# Patient Record
Sex: MALE | Race: WHITE | Employment: UNEMPLOYED | URBAN - METROPOLITAN AREA
[De-identification: names, ages, dates, MRNs, and addresses within clinical notes are randomized per-mention and may not be internally consistent; named-entity substitution may affect disease eponyms.]

---

## 2021-02-26 ENCOUNTER — HOSPITAL ENCOUNTER (OUTPATIENT)
Dept: GENERAL RADIOLOGY | Age: 46
Discharge: HOME | End: 2021-02-26
Attending: FAMILY MEDICINE
Payer: COMMERCIAL

## 2021-02-26 DIAGNOSIS — M25.552 LEFT HIP PAIN: ICD-10-CM

## 2021-02-26 DIAGNOSIS — M54.42 ACUTE BACK PAIN WITH SCIATICA, LEFT: ICD-10-CM

## 2021-02-26 PROCEDURE — 72110 X-RAY EXAM L-2 SPINE 4/>VWS: CPT | Performed by: FAMILY MEDICINE

## 2021-02-26 PROCEDURE — 73502 X-RAY EXAM HIP UNI 2-3 VIEWS: CPT | Performed by: FAMILY MEDICINE

## 2021-02-26 PROCEDURE — 72100 X-RAY EXAM L-S SPINE 2/3 VWS: CPT | Performed by: FAMILY MEDICINE

## 2021-06-17 ENCOUNTER — OFFICE VISIT (OUTPATIENT)
Dept: ORTHOPEDICS CLINIC | Facility: CLINIC | Age: 46
End: 2021-06-17

## 2021-06-17 DIAGNOSIS — M54.16 LUMBAR RADICULOPATHY: Primary | ICD-10-CM

## 2021-06-17 PROCEDURE — 99244 OFF/OP CNSLTJ NEW/EST MOD 40: CPT | Performed by: ORTHOPAEDIC SURGERY

## 2021-06-17 NOTE — PROGRESS NOTES
NURSING INTAKE COMMENTS: Patient presents with:  Consult: hip pain since 11/2020. no injuries. pain started after helping a friend move. pain is everyday. rates pain today 6/10. xrays were done 2/26/21.       HPI: This 51year old male presents today with co shortness of breath, cough, asthma, wheezing  CARDIOVASCULAR: denies chest pain, leg cramps with exertion, palpitations, leg swelling  GI: denies abdominal pain, nausea, vomiting, diarrhea, constipation, hematochezia, worsening heartburn or stomach ulcers lumbar spine reviewed the x-rays show loss of the lumbar lordosis but no spondylolisthesis. No obvious fracture moderate degenerative changes are noted along the facets mid to lower lumbar levels.     Labs:  No results found for: WBC, HGB, PLT   No results

## 2021-07-12 ENCOUNTER — HOSPITAL ENCOUNTER (OUTPATIENT)
Dept: MRI IMAGING | Facility: HOSPITAL | Age: 46
Discharge: HOME | End: 2021-07-12
Attending: ORTHOPAEDIC SURGERY
Payer: COMMERCIAL

## 2021-07-12 DIAGNOSIS — M54.16 LUMBAR RADICULOPATHY: ICD-10-CM

## 2021-07-12 PROCEDURE — 72148 MRI LUMBAR SPINE W/O DYE: CPT | Performed by: ORTHOPAEDIC SURGERY

## 2021-07-22 ENCOUNTER — OFFICE VISIT (OUTPATIENT)
Dept: ORTHOPEDICS CLINIC | Facility: CLINIC | Age: 46
End: 2021-07-22

## 2021-07-22 VITALS — BODY MASS INDEX: 25.05 KG/M2 | HEIGHT: 70 IN | WEIGHT: 175 LBS

## 2021-07-22 DIAGNOSIS — M54.16 LUMBAR RADICULOPATHY: Primary | ICD-10-CM

## 2021-07-22 PROCEDURE — 3008F BODY MASS INDEX DOCD: CPT | Performed by: ORTHOPAEDIC SURGERY

## 2021-07-22 PROCEDURE — 99213 OFFICE O/P EST LOW 20 MIN: CPT | Performed by: ORTHOPAEDIC SURGERY

## 2021-07-22 NOTE — PROGRESS NOTES
NURSING INTAKE COMMENTS: Patient presents with:  Back Pain: pt in to discuss MRI of the spine, pain at a 7/10 today      HPI: This 51year old male presents today with complaints of left lower extremity and back pain follow-up.   He continues to have some m autoimmune disease, thyroid issues, or diabetes  ALLERGY: denies asthma, seasonal allergies    Physical Examination:    Ht 5' 10\" (1.778 m)   Wt 175 lb (79.4 kg)   BMI 25.11 kg/m²   Constitutional: appears well hydrated, alert and responsive, no acute dis in significant neural compromise. 2. Linear morphology edema involving the left L5 pedicle. This finding is most commonly seen in the setting of degenerative stress response or altered biomechanics. 3. Retroaortic left renal vein.  4. Lesser incidental fin

## 2021-07-26 ENCOUNTER — TELEPHONE (OUTPATIENT)
Dept: NEUROLOGY | Facility: CLINIC | Age: 46
End: 2021-07-26

## 2021-07-26 ENCOUNTER — OFFICE VISIT (OUTPATIENT)
Dept: NEUROLOGY | Facility: CLINIC | Age: 46
End: 2021-07-26

## 2021-07-26 VITALS
SYSTOLIC BLOOD PRESSURE: 120 MMHG | WEIGHT: 175 LBS | OXYGEN SATURATION: 96 % | BODY MASS INDEX: 25.05 KG/M2 | DIASTOLIC BLOOD PRESSURE: 70 MMHG | HEART RATE: 67 BPM | HEIGHT: 70 IN

## 2021-07-26 DIAGNOSIS — M54.16 CHRONIC LUMBAR RADICULOPATHY: ICD-10-CM

## 2021-07-26 DIAGNOSIS — M47.816 FACET ARTHROPATHY, LUMBAR: Primary | ICD-10-CM

## 2021-07-26 PROCEDURE — 3078F DIAST BP <80 MM HG: CPT | Performed by: PHYSICAL MEDICINE & REHABILITATION

## 2021-07-26 PROCEDURE — 3074F SYST BP LT 130 MM HG: CPT | Performed by: PHYSICAL MEDICINE & REHABILITATION

## 2021-07-26 PROCEDURE — 3008F BODY MASS INDEX DOCD: CPT | Performed by: PHYSICAL MEDICINE & REHABILITATION

## 2021-07-26 PROCEDURE — 99244 OFF/OP CNSLTJ NEW/EST MOD 40: CPT | Performed by: PHYSICAL MEDICINE & REHABILITATION

## 2021-07-26 RX ORDER — METHYLPREDNISOLONE 4 MG/1
TABLET ORAL
Qty: 1 EACH | Refills: 0 | Status: SHIPPED | OUTPATIENT
Start: 2021-07-26

## 2021-07-26 RX ORDER — FINASTERIDE 5 MG/1
TABLET, FILM COATED ORAL
COMMUNITY
Start: 2021-07-22

## 2021-07-26 NOTE — PATIENT INSTRUCTIONS
-Medrol dose pack to be started today  -Start PT and home exercises  -My office will call once injection is approved

## 2021-07-26 NOTE — PROGRESS NOTES
[address]  NEW PATIENT EVALUATION    Consultation as a request of Dr. Janie Hurley    Chief Complaint: back pain.     HISTORY OF PRESENT ILLNESS:   Patient presents with:  Low Back Pain: c/o low back pain with s each 0   • finasteride 5 MG Oral Tab            ALLERGIES:   No Known Allergies      FAMILY HISTORY:   History reviewed. No pertinent family history.        SOCIAL HISTORY:   Social History    Tobacco Use      Smoking status: Not on file    Alcohol use: Not intact  Psychiatric: Mood and affect appropriate    Gait Normal  Able to toe walk and heel walk without any difficulty  Posture: No scoliosis or kyphosis    Musculoskeletal/Neurological Exam:    LUMBAR SPINE:  Inspection: no erythema, swelling, or obvious spinal or foraminal stenosis at this level. All imaging results were reviewed and discussed with patient. ASSESSMENT:     1. Facet arthropathy, lumbar    2.  Chronic lumbar radiculopathy        Daren Gage is a pleasant 65-year-old gentleman wh

## 2021-07-26 NOTE — TELEPHONE ENCOUNTER
Submitted request to [facility] for Left L3-4, L4-5 and L5-S1 Facet joint injection under fluoroscopy guidance CPT [id number] to be done at [facility] and for Physical Therapy CPT [id number]    Status: pending [facility] clinical review

## 2021-07-30 NOTE — TELEPHONE ENCOUNTER
Left L3-4, L4-5 and L5-S1 Facet joint injection under fluoroscopy guidance-pending approval   Called [facility], spoke to Cinda Kam who states Referral # [id number] is currently pending.    Adding to priority list for review but will not be completed till around [facility]

## 2021-08-03 NOTE — TELEPHONE ENCOUNTER
Carlee Galaviz at [facility] to check status of facet injections. -still pending

## 2021-08-03 NOTE — TELEPHONE ENCOUNTER
Received notice of Approval from [facility] at [facility] for Left L3-4, L4-5 and L5-S1 Facet joint injection under fluoroscopy guidance valid until 11/3/21 to be done at [address] [facility] to clinical staff to schedule

## 2021-08-04 NOTE — TELEPHONE ENCOUNTER
Patient has been scheduled for a Left L3-4, L4-5 and L5-S1 Facet joint injection on 8/16/21 at the [facility]. Medications and allergies reviewed.  Patient informed we will need verbal or written authorization from patients Primary Care Physician/Cardiologist to

## 2021-08-16 ENCOUNTER — OFFICE VISIT (OUTPATIENT)
Dept: SURGERY | Facility: CLINIC | Age: 46
End: 2021-08-16

## 2021-08-16 DIAGNOSIS — M47.816 FACET ARTHROPATHY, LUMBAR: Primary | ICD-10-CM

## 2021-08-16 PROCEDURE — 64493 INJ PARAVERT F JNT L/S 1 LEV: CPT | Performed by: PHYSICAL MEDICINE & REHABILITATION

## 2021-08-16 PROCEDURE — 64495 INJ PARAVERT F JNT L/S 3 LEV: CPT | Performed by: PHYSICAL MEDICINE & REHABILITATION

## 2021-08-16 PROCEDURE — 64494 INJ PARAVERT F JNT L/S 2 LEV: CPT | Performed by: PHYSICAL MEDICINE & REHABILITATION

## 2021-08-16 NOTE — PROGRESS NOTES
[facility] Z-JOINT/FACET INJECTIONS  NAME:  Tyrel Segura    MR #:    [id number] :  1975     PHYSICIAN:  Moshe Valladares. Rebecca Lundborg, DO        Operative Report    DATE OF PROCEDURE: 2021   PREOPERATIVE DIAGNOSES: 1.  Fa procedure, the patient's pulse oximetry and vital signs were monitored and they remained completely stable. Also, throughout the whole procedure, prior to injection of any medication, aspiration was performed.   No blood, fluid, or air was aspirated at any

## 2021-09-02 ENCOUNTER — TELEPHONE (OUTPATIENT)
Dept: PHYSICAL MEDICINE AND REHAB | Facility: CLINIC | Age: 46
End: 2021-09-02

## 2021-09-03 ENCOUNTER — TELEMEDICINE (OUTPATIENT)
Dept: PHYSICAL MEDICINE AND REHAB | Facility: CLINIC | Age: 46
End: 2021-09-03

## 2021-09-03 DIAGNOSIS — M79.10 MYALGIA: Primary | ICD-10-CM

## 2021-09-03 PROCEDURE — 99214 OFFICE O/P EST MOD 30 MIN: CPT | Performed by: PHYSICAL MEDICINE & REHABILITATION

## 2021-09-03 RX ORDER — DULOXETIN HYDROCHLORIDE 30 MG/1
30 CAPSULE, DELAYED RELEASE ORAL DAILY
Qty: 30 CAPSULE | Refills: 0 | Status: SHIPPED | OUTPATIENT
Start: 2021-09-03

## 2021-09-03 NOTE — PROGRESS NOTES
[address]    Telemedicine Visit - Follow Up Evaluation    Telehealth Verbal Consent   I conducted a telehealth visit with Gerliliam Leader today, 09/03/21, which was completed using two-way, real-time inter He also has pain radiating in bilateral lower extremities but cannot identify in specific areas. He denies any changes in strength sensation or bowel bladder. He feels a knifelike pain in his left hip.   He feels a knot in the muscle that he associates wi AGRATIO, ALBGLOBRAT, NA, K, CL, CO2  No results found for: PTP, PT, INR  No results found for: VITD, QVITD, VITD25, [id number]    IMAGING:   MRI LUMBAR SPINE dated July 12, 2021 revealed:      I personally reviewed MRI imaging of the lumbar spine which is no risk and complications of infection. The patient was advised that given the current situation with COVID-19, it is in his/her best interest to socially distance his/herself.  Given this, we are not recommending any elective procedures or office visits at

## 2021-09-07 ENCOUNTER — TELEPHONE (OUTPATIENT)
Dept: NEUROLOGY | Facility: CLINIC | Age: 46
End: 2021-09-07

## 2021-09-16 ENCOUNTER — TELEPHONE (OUTPATIENT)
Dept: PHYSICAL THERAPY | Age: 46
End: 2021-09-16

## 2021-09-17 ENCOUNTER — TELEPHONE (OUTPATIENT)
Dept: PHYSICAL THERAPY | Facility: HOSPITAL | Age: 46
End: 2021-09-17

## 2021-09-23 ENCOUNTER — OFFICE VISIT (OUTPATIENT)
Dept: PHYSICAL THERAPY | Age: 46
End: 2021-09-23
Attending: PHYSICAL MEDICINE & REHABILITATION
Payer: COMMERCIAL

## 2021-09-23 DIAGNOSIS — M47.816 FACET ARTHROPATHY, LUMBAR: ICD-10-CM

## 2021-09-23 DIAGNOSIS — M54.16 LUMBAR RADICULOPATHY: ICD-10-CM

## 2021-09-23 PROCEDURE — 97140 MANUAL THERAPY 1/> REGIONS: CPT | Performed by: PHYSICAL THERAPIST

## 2021-09-23 PROCEDURE — 97110 THERAPEUTIC EXERCISES: CPT | Performed by: PHYSICAL THERAPIST

## 2021-09-23 PROCEDURE — 97162 PT EVAL MOD COMPLEX 30 MIN: CPT | Performed by: PHYSICAL THERAPIST

## 2021-09-23 NOTE — PROGRESS NOTES
SPINE EVALUATION:   Referring Physician: Dr. Adeline Morse  Diagnosis: Facet arthropathy, lumbar (G27.365)     Date of Service: 9/23/2021     PATIENT SUMMARY   Medhat Bowser is a 51year old male who presents to therapy today with complaints of LBP radiating inability to tolerate sustained sitting, standing and walking, inability to bend and lift. Pt and PT discussed evaluation findings, pathology, POC and HEP. Pt voiced understanding and performs HEP correctly without reported pain.  Skilled Physical Therapy issued a HEP for: Prone laying and prone on elbows in left roadkill position 3-5 mins ea every 2 hours.     Charges: PT Eval Moderate Complexity, 30 mins      Total Timed Treatment: 30 min     Total Treatment Time: 60 min     Based on clinical rationale and therapist: Leona Claude, PT    [de-identified] certification required: Yes  I certify the need for these services furnished under this plan of treatment and while under my care.     X___________________________________________________ Date____________________

## 2021-09-27 ENCOUNTER — OFFICE VISIT (OUTPATIENT)
Dept: PHYSICAL THERAPY | Age: 46
End: 2021-09-27
Attending: PHYSICAL MEDICINE & REHABILITATION
Payer: COMMERCIAL

## 2021-09-27 PROCEDURE — 97140 MANUAL THERAPY 1/> REGIONS: CPT | Performed by: PHYSICAL THERAPIST

## 2021-09-27 PROCEDURE — 97014 ELECTRIC STIMULATION THERAPY: CPT | Performed by: PHYSICAL THERAPIST

## 2021-09-27 PROCEDURE — 97110 THERAPEUTIC EXERCISES: CPT | Performed by: PHYSICAL THERAPIST

## 2021-09-27 NOTE — PROGRESS NOTES
Dx: Facet arthropathy, lumbar M47.816  Lumbar radiculopathy M54.16    Insurance (Authorized # of Visits):  2 out of 8           Authorizing Physician: Dr. Panfilo Amaya  Next MD visit: none scheduled  Fall Risk: standard         Precautions: n/a             [employer] IFC 0-150Hz with HP 15 mins       HEP: Added JOSHUA with prop-ups and eliminated roadkill position. Charges:  Thera Ex x 2; Manual Tx x 1; Unattended E-stim x 1       Total Timed Treatment: 48 min  Total Treatment Time: 63 min

## 2021-09-29 ENCOUNTER — OFFICE VISIT (OUTPATIENT)
Dept: PHYSICAL THERAPY | Age: 46
End: 2021-09-29
Attending: PHYSICAL MEDICINE & REHABILITATION
Payer: COMMERCIAL

## 2021-09-29 PROCEDURE — 97014 ELECTRIC STIMULATION THERAPY: CPT | Performed by: PHYSICAL THERAPIST

## 2021-09-29 PROCEDURE — 97110 THERAPEUTIC EXERCISES: CPT | Performed by: PHYSICAL THERAPIST

## 2021-09-29 PROCEDURE — 97140 MANUAL THERAPY 1/> REGIONS: CPT | Performed by: PHYSICAL THERAPIST

## 2021-09-29 NOTE — PROGRESS NOTES
Dx: Facet arthropathy, lumbar M47.816  Lumbar radiculopathy M54.16    Insurance (Authorized # of Visits):  3 out of 8           Authorizing Physician: Dr. Keith Forbes  Next MD visit: none scheduled  Fall Risk: standard         Precautions: n/a             [place] Paralumbars/piriformis/gluts x 15 mins Paralumbars/piriformis/gluts x 15 mins      Modalities         IFC 0-150Hz with HP 15 mins 15 mins      HEP: Added JOSHUA with alt knee flexion with ankle pumps for dural glides 2x/day    Charges:  Thera Ex x 3; Manual Tx

## 2021-10-01 RX ORDER — DULOXETIN HYDROCHLORIDE 30 MG/1
CAPSULE, DELAYED RELEASE ORAL
Qty: 30 CAPSULE | Refills: 0 | OUTPATIENT
Start: 2021-10-01

## 2021-10-04 ENCOUNTER — OFFICE VISIT (OUTPATIENT)
Dept: PHYSICAL THERAPY | Age: 46
End: 2021-10-04
Attending: PHYSICAL MEDICINE & REHABILITATION
Payer: COMMERCIAL

## 2021-10-04 PROCEDURE — 97110 THERAPEUTIC EXERCISES: CPT | Performed by: PHYSICAL THERAPIST

## 2021-10-04 PROCEDURE — 97014 ELECTRIC STIMULATION THERAPY: CPT | Performed by: PHYSICAL THERAPIST

## 2021-10-04 PROCEDURE — 97140 MANUAL THERAPY 1/> REGIONS: CPT | Performed by: PHYSICAL THERAPIST

## 2021-10-04 NOTE — PROGRESS NOTES
Dx: Facet arthropathy, lumbar M47.816  Lumbar radiculopathy M54.16    Insurance (Authorized # of Visits):  4 out of 8           Authorizing Physician: Dr. Rocco De La Torre  Next MD visit: none scheduled  Fall Risk: standard         Precautions: n/a             [place] Prone heel squeeze  1x10x5 secs      REIL   1x10 in (L) roadkill             Manual Therapy 18 mins 15 15 mins     Lumbar PA's L3-5 Gr 1-3 x 3 mins L3-4 Gr 1-3 x 3 mins L3-4 Gr 1-3 x 3 mins     STM Paralumbars/piriformis/gluts x 15 mins Paralumbars/pirif

## 2021-10-06 ENCOUNTER — OFFICE VISIT (OUTPATIENT)
Dept: PHYSICAL THERAPY | Age: 46
End: 2021-10-06
Attending: PHYSICAL MEDICINE & REHABILITATION
Payer: COMMERCIAL

## 2021-10-06 PROCEDURE — 97110 THERAPEUTIC EXERCISES: CPT | Performed by: PHYSICAL THERAPIST

## 2021-10-06 PROCEDURE — 97014 ELECTRIC STIMULATION THERAPY: CPT | Performed by: PHYSICAL THERAPIST

## 2021-10-06 PROCEDURE — 97140 MANUAL THERAPY 1/> REGIONS: CPT | Performed by: PHYSICAL THERAPIST

## 2021-10-06 NOTE — PROGRESS NOTES
Dx: Facet arthropathy, lumbar M47.816  Lumbar radiculopathy M54.16    Insurance (Authorized # of Visits):  5 out of 8           Authorizing Physician: Dr. Mike Iraheta  Next MD visit: none scheduled  Fall Risk: standard         Precautions: n/a             [place] squeeze  1x10x5 secs  4x10; last set with knee flexion    REIL   1x10 in (L) roadkill 2x10 in roadkill            Manual Therapy 18 mins 15 15 mins 15 mins    Lumbar PA's L3-5 Gr 1-3 x 3 mins L3-4 Gr 1-3 x 3 mins L3-4 Gr 1-3 x 3 mins L3-4 Gr 3-4 x 5 mins

## 2021-10-11 ENCOUNTER — APPOINTMENT (OUTPATIENT)
Dept: PHYSICAL THERAPY | Age: 46
End: 2021-10-11
Attending: PHYSICAL MEDICINE & REHABILITATION
Payer: COMMERCIAL

## 2021-10-13 ENCOUNTER — TELEPHONE (OUTPATIENT)
Dept: NEUROLOGY | Facility: CLINIC | Age: 46
End: 2021-10-13

## 2021-10-13 ENCOUNTER — OFFICE VISIT (OUTPATIENT)
Dept: PHYSICAL THERAPY | Age: 46
End: 2021-10-13
Attending: PHYSICAL MEDICINE & REHABILITATION
Payer: COMMERCIAL

## 2021-10-13 DIAGNOSIS — M47.816 FACET ARTHROPATHY, LUMBAR: Primary | ICD-10-CM

## 2021-10-13 DIAGNOSIS — M54.16 CHRONIC LUMBAR RADICULOPATHY: ICD-10-CM

## 2021-10-13 PROCEDURE — 97110 THERAPEUTIC EXERCISES: CPT | Performed by: PHYSICAL THERAPIST

## 2021-10-13 PROCEDURE — 97140 MANUAL THERAPY 1/> REGIONS: CPT | Performed by: PHYSICAL THERAPIST

## 2021-10-13 PROCEDURE — 97014 ELECTRIC STIMULATION THERAPY: CPT | Performed by: PHYSICAL THERAPIST

## 2021-10-13 NOTE — PROGRESS NOTES
Dx: Facet arthropathy, lumbar M47.816  Lumbar radiculopathy M54.16    Insurance (Authorized # of Visits):  6 out of 8           Authorizing Physician: Dr. Harika Martin  Next MD visit: none scheduled  Fall Risk: standard         Precautions: n/a             [place] mins 3 mins 3 mins 3 mins 3 mins   JOSHUA with prop ups 3x10 2x10 3x10     Piriformins stretch Prone 5 x 30secs Prone 5 x 30secs Prone 3 x 30 secs Prone 3x30 secs Prone 3x30 secs   JOSHUA with knee flexion/ankle pumps  2x10 2x10 3x10 3x10   Prone heel squeeze  1

## 2021-10-13 NOTE — TELEPHONE ENCOUNTER
Submitted request to [facility] MA[facility]SA for Additional Physical Therapy CPT 30051     Status: pending [facility] clinical review

## 2021-10-13 NOTE — TELEPHONE ENCOUNTER
PT ordered. Thank you.     Luciana Humphrey DO, FAAPMR & CAQSM  Physical Medicine and Rehabilitation/Sports Medicine  MEDICAL CENTER [facility]

## 2021-10-13 NOTE — TELEPHONE ENCOUNTER
Requesting more PT, patient has been scheduled already. PT order    S/w patient is okay with his virtual appointment.

## 2021-10-15 ENCOUNTER — OFFICE VISIT (OUTPATIENT)
Dept: PHYSICAL THERAPY | Age: 46
End: 2021-10-15
Attending: PHYSICAL THERAPIST
Payer: COMMERCIAL

## 2021-10-15 PROCEDURE — 97014 ELECTRIC STIMULATION THERAPY: CPT | Performed by: PHYSICAL THERAPIST

## 2021-10-15 PROCEDURE — 97140 MANUAL THERAPY 1/> REGIONS: CPT | Performed by: PHYSICAL THERAPIST

## 2021-10-15 PROCEDURE — 97110 THERAPEUTIC EXERCISES: CPT | Performed by: PHYSICAL THERAPIST

## 2021-10-15 NOTE — PROGRESS NOTES
ProgressSummary  Pt has attended 7 out of 8 authorized visits in Physical Therapy.    Dx: Facet arthropathy, lumbar M47.816  Lumbar radiculopathy M54.16    Insurance (Authorized # of Visits):  7 out of 8           Authorizing Physician: Dr. Edie Groves MD sleep.  [place] will be able to achieve WNL lumbar AROM to allow tolerance to sustained standing and walking without symptom aggravation  [place] will be able to return to painfree lumbar flexion to allow bending and performance of work and ADL's below waist l 15 mins 15 mins   HEP: Continue REIL every 2 hours. Charges:  Thera Ex x 2; Manual Tx x 1; Unattended E-stim x 1       Total Timed Treatment: 38 min  Total Treatment Time: 53 min    Plan: Continue skilled Physical Therapy 2 x/week or a total of 16 visits

## 2021-10-18 ENCOUNTER — OFFICE VISIT (OUTPATIENT)
Dept: PHYSICAL THERAPY | Age: 46
End: 2021-10-18
Attending: PHYSICAL MEDICINE & REHABILITATION
Payer: COMMERCIAL

## 2021-10-18 PROCEDURE — 97014 ELECTRIC STIMULATION THERAPY: CPT | Performed by: PHYSICAL THERAPIST

## 2021-10-18 PROCEDURE — 97140 MANUAL THERAPY 1/> REGIONS: CPT | Performed by: PHYSICAL THERAPIST

## 2021-10-18 PROCEDURE — 97110 THERAPEUTIC EXERCISES: CPT | Performed by: PHYSICAL THERAPIST

## 2021-10-18 NOTE — PROGRESS NOTES
Dx: Facet arthropathy, lumbar M47.816  Lumbar radiculopathy M54.16    Insurance (Authorized # of Visits):  8 out of 8           Authorizing Physician: Dr. Jose Payne  Next MD visit: none scheduled  Fall Risk: standard         Precautions: n/a             [place] 12 mins 12% grade 1. 2mph x 15 mins   Prone laying 5 mins 5 mins 3 mins 3 mins 3 mins 3 mins    Prone on elbows 3 mins 3 mins 3 mins 3 mins 3 mins 3 mins    JOSHUA with prop ups 3x10 2x10 3x10       Piriformins stretch Prone 5 x 30secs Prone 5 x 30secs Prone 3

## 2021-10-25 ENCOUNTER — APPOINTMENT (OUTPATIENT)
Dept: PHYSICAL THERAPY | Age: 46
End: 2021-10-25
Attending: PHYSICAL MEDICINE & REHABILITATION
Payer: COMMERCIAL

## 2021-10-26 ENCOUNTER — TELEPHONE (OUTPATIENT)
Dept: PHYSICAL THERAPY | Facility: HOSPITAL | Age: 46
End: 2021-10-26

## 2021-10-27 ENCOUNTER — APPOINTMENT (OUTPATIENT)
Dept: PHYSICAL THERAPY | Age: 46
End: 2021-10-27
Attending: PHYSICAL MEDICINE & REHABILITATION
Payer: COMMERCIAL

## 2021-10-28 ENCOUNTER — APPOINTMENT (OUTPATIENT)
Dept: PHYSICAL THERAPY | Age: 46
End: 2021-10-28
Attending: PHYSICAL MEDICINE & REHABILITATION
Payer: COMMERCIAL

## 2021-10-29 ENCOUNTER — TELEPHONE (OUTPATIENT)
Dept: PHYSICAL THERAPY | Facility: HOSPITAL | Age: 46
End: 2021-10-29

## 2021-10-29 ENCOUNTER — TELEMEDICINE (OUTPATIENT)
Dept: PHYSICAL MEDICINE AND REHAB | Facility: CLINIC | Age: 46
End: 2021-10-29

## 2021-10-29 DIAGNOSIS — M79.10 MYALGIA: Primary | ICD-10-CM

## 2021-10-29 PROCEDURE — 99214 OFFICE O/P EST MOD 30 MIN: CPT | Performed by: PHYSICAL MEDICINE & REHABILITATION

## 2021-10-29 RX ORDER — MELOXICAM 15 MG/1
15 TABLET ORAL DAILY
Qty: 14 TABLET | Refills: 0 | Status: SHIPPED | OUTPATIENT
Start: 2021-10-29 | End: 2021-11-12

## 2021-11-01 PROBLEM — M79.10 MYALGIA: Status: ACTIVE | Noted: 2021-11-01

## 2021-11-01 NOTE — PROGRESS NOTES
[address]    Telemedicine Visit - Follow Up Evaluation    Telehealth Verbal Consent   I conducted a telehealth visit with Tayla Irizarry today, 10/29/21, which was completed using two-way, real-time inter not tolerate it due to side effects. He does feel improvement with NSAID medication. He denies any change in strength sensation or bowel bladder.   Overall, his pain is better however he still has some discomfort that affects his function and ability to s GLOBULIN, AGRATIO, ALBGLOBRAT, NA, K, CL, CO2  No results found for: PTP, PT, INR  No results found for: VITD, QVITD, VITD25, [id number]    IMAGING:   MRI LUMBAR SPINE dated July 12, 2021 revealed:      I personally reviewed MRI imaging of the lumbar spine w his/herself. Given this, we are not recommending any elective procedures or office visits at the outpatient surgery center or in the office respectively unless deemed necessary.   My staff will be reaching out to the patient for the elective procedure when

## 2021-11-02 ENCOUNTER — APPOINTMENT (OUTPATIENT)
Dept: PHYSICAL THERAPY | Age: 46
End: 2021-11-02
Attending: PHYSICAL MEDICINE & REHABILITATION
Payer: COMMERCIAL

## 2021-11-02 ENCOUNTER — TELEPHONE (OUTPATIENT)
Dept: PHYSICAL MEDICINE AND REHAB | Facility: CLINIC | Age: 46
End: 2021-11-02

## 2021-11-04 ENCOUNTER — APPOINTMENT (OUTPATIENT)
Dept: PHYSICAL THERAPY | Age: 46
End: 2021-11-04
Attending: PHYSICAL MEDICINE & REHABILITATION
Payer: COMMERCIAL

## 2021-11-08 ENCOUNTER — OFFICE VISIT (OUTPATIENT)
Dept: PHYSICAL THERAPY | Age: 46
End: 2021-11-08
Attending: PHYSICAL MEDICINE & REHABILITATION
Payer: COMMERCIAL

## 2021-11-08 PROCEDURE — 97014 ELECTRIC STIMULATION THERAPY: CPT | Performed by: PHYSICAL THERAPIST

## 2021-11-08 PROCEDURE — 97140 MANUAL THERAPY 1/> REGIONS: CPT | Performed by: PHYSICAL THERAPIST

## 2021-11-08 PROCEDURE — 97110 THERAPEUTIC EXERCISES: CPT | Performed by: PHYSICAL THERAPIST

## 2021-11-08 NOTE — PROGRESS NOTES
Dx: Facet arthropathy, lumbar M47.816  Lumbar radiculopathy M54.16    Insurance (Authorized # of Visits):  12           Authorizing Physician: Dr. Edie Jasso  Next MD visit: none scheduled  Fall Risk: standard         Precautions: n/a           Pain ratin/1 Piriformins stretch Prone 3x30 secs Prone 3x30 secs      JOSHUA with knee flexion/ankle pumps 3x10  3x10     Prone heel squeeze 3x10; last 2 sets with knee flexion  3x10 with knee flexion 3x10 with knee flexion    REIL 2x10 with therapist overpressure  3x10

## 2021-11-09 ENCOUNTER — APPOINTMENT (OUTPATIENT)
Dept: PHYSICAL THERAPY | Age: 46
End: 2021-11-09
Attending: PHYSICAL MEDICINE & REHABILITATION
Payer: COMMERCIAL

## 2021-11-10 ENCOUNTER — OFFICE VISIT (OUTPATIENT)
Dept: PHYSICAL THERAPY | Age: 46
End: 2021-11-10
Attending: PHYSICAL MEDICINE & REHABILITATION
Payer: COMMERCIAL

## 2021-11-10 PROCEDURE — 97110 THERAPEUTIC EXERCISES: CPT | Performed by: PHYSICAL THERAPIST

## 2021-11-10 PROCEDURE — 97014 ELECTRIC STIMULATION THERAPY: CPT | Performed by: PHYSICAL THERAPIST

## 2021-11-10 PROCEDURE — 97140 MANUAL THERAPY 1/> REGIONS: CPT | Performed by: PHYSICAL THERAPIST

## 2021-11-10 NOTE — PROGRESS NOTES
Dx: Facet arthropathy, lumbar M47.816  Lumbar radiculopathy M54.16    Insurance (Authorized # of Visits):  12           Authorizing Physician: Dr. Mike Iraheta  Next MD visit: none scheduled  Fall Risk: standard         Precautions: n/a           Pain rating: 3/1 JOSHUA with prop ups         Piriformins stretch Prone 3x30 secs Prone 3x30 secs       JOSHUA with knee flexion/ankle pumps 3x10  3x10      Prone heel squeeze 3x10; last 2 sets with knee flexion  3x10 with knee flexion 3x10 with knee flexion     REIL 2x10 w

## 2021-11-11 ENCOUNTER — APPOINTMENT (OUTPATIENT)
Dept: PHYSICAL THERAPY | Age: 46
End: 2021-11-11
Attending: PHYSICAL MEDICINE & REHABILITATION
Payer: COMMERCIAL

## 2021-11-15 ENCOUNTER — TELEPHONE (OUTPATIENT)
Dept: PHYSICAL THERAPY | Facility: HOSPITAL | Age: 46
End: 2021-11-15

## 2021-11-15 ENCOUNTER — APPOINTMENT (OUTPATIENT)
Dept: PHYSICAL THERAPY | Age: 46
End: 2021-11-15
Attending: PHYSICAL MEDICINE & REHABILITATION
Payer: COMMERCIAL

## 2021-11-16 ENCOUNTER — APPOINTMENT (OUTPATIENT)
Dept: PHYSICAL THERAPY | Age: 46
End: 2021-11-16
Attending: PHYSICAL MEDICINE & REHABILITATION
Payer: COMMERCIAL

## 2021-11-17 ENCOUNTER — OFFICE VISIT (OUTPATIENT)
Dept: PHYSICAL THERAPY | Age: 46
End: 2021-11-17
Attending: PHYSICAL MEDICINE & REHABILITATION
Payer: COMMERCIAL

## 2021-11-17 PROCEDURE — 97140 MANUAL THERAPY 1/> REGIONS: CPT | Performed by: PHYSICAL THERAPIST

## 2021-11-17 PROCEDURE — 97014 ELECTRIC STIMULATION THERAPY: CPT | Performed by: PHYSICAL THERAPIST

## 2021-11-17 PROCEDURE — 97110 THERAPEUTIC EXERCISES: CPT | Performed by: PHYSICAL THERAPIST

## 2021-11-17 NOTE — PROGRESS NOTES
Dx: Facet arthropathy, lumbar M47.816  Lumbar radiculopathy M54.16    Insurance (Authorized # of Visits):  12           Authorizing Physician: Dr. Panfilo Amaya  Next MD visit: none scheduled  Fall Risk: standard         Precautions: n/a           Pain ratin/1 [place] TA isometrics 3x10       Prone multifidus isometrics 1x10 2x10      Prone hip ext 2x10 3x10      Standing hip 3-way 2x10 airex 3x10 BOSU 3x10 BOSU     Lateral step ups with dips 2x10 airex 2x10 BOSU      Pallof press   3x10 green     [place]

## 2021-11-18 ENCOUNTER — APPOINTMENT (OUTPATIENT)
Dept: PHYSICAL THERAPY | Age: 46
End: 2021-11-18
Attending: PHYSICAL MEDICINE & REHABILITATION
Payer: COMMERCIAL

## 2021-11-22 ENCOUNTER — APPOINTMENT (OUTPATIENT)
Dept: PHYSICAL THERAPY | Age: 46
End: 2021-11-22
Attending: PHYSICAL MEDICINE & REHABILITATION
Payer: COMMERCIAL

## 2021-11-24 ENCOUNTER — APPOINTMENT (OUTPATIENT)
Dept: PHYSICAL THERAPY | Age: 46
End: 2021-11-24
Attending: PHYSICAL MEDICINE & REHABILITATION
Payer: COMMERCIAL

## 2021-11-29 ENCOUNTER — APPOINTMENT (OUTPATIENT)
Dept: PHYSICAL THERAPY | Age: 46
End: 2021-11-29
Attending: PHYSICAL MEDICINE & REHABILITATION
Payer: COMMERCIAL

## 2021-12-01 ENCOUNTER — OFFICE VISIT (OUTPATIENT)
Dept: PHYSICAL THERAPY | Age: 46
End: 2021-12-01
Attending: PHYSICAL MEDICINE & REHABILITATION
Payer: COMMERCIAL

## 2021-12-01 PROCEDURE — 97035 APP MDLTY 1+ULTRASOUND EA 15: CPT | Performed by: PHYSICAL THERAPIST

## 2021-12-01 PROCEDURE — 97140 MANUAL THERAPY 1/> REGIONS: CPT | Performed by: PHYSICAL THERAPIST

## 2021-12-01 PROCEDURE — 97110 THERAPEUTIC EXERCISES: CPT | Performed by: PHYSICAL THERAPIST

## 2021-12-01 PROCEDURE — 97014 ELECTRIC STIMULATION THERAPY: CPT | Performed by: PHYSICAL THERAPIST

## 2021-12-01 NOTE — PROGRESS NOTES
Dx: Facet arthropathy, lumbar M47.816  Lumbar radiculopathy M54.16    Insurance (Authorized # of Visits):  12           Authorizing Physician: Dr. Jeannette Vallejo  Next MD visit: none scheduled  Fall Risk: standard         Precautions: n/a           Pain ratin/1 JOSHUA with prop ups         Piriformins stretch         JOSHUA with knee flexion/ankle pumps 3x10        Prone heel squeeze 3x10 with knee flexion 3x10 with knee flexion       REIL 3x10 3x10 with belt overpressure 3x10 3x10 with belt overpressure     [place]

## 2021-12-06 ENCOUNTER — OFFICE VISIT (OUTPATIENT)
Dept: PHYSICAL THERAPY | Age: 46
End: 2021-12-06
Attending: PHYSICAL THERAPIST
Payer: COMMERCIAL

## 2021-12-06 PROCEDURE — 97140 MANUAL THERAPY 1/> REGIONS: CPT | Performed by: PHYSICAL THERAPIST

## 2021-12-06 PROCEDURE — 97110 THERAPEUTIC EXERCISES: CPT | Performed by: PHYSICAL THERAPIST

## 2021-12-06 PROCEDURE — 97014 ELECTRIC STIMULATION THERAPY: CPT | Performed by: PHYSICAL THERAPIST

## 2021-12-06 NOTE — PROGRESS NOTES
Dx: Facet arthropathy, lumbar M47.816  Lumbar radiculopathy M54.16    Insurance (Authorized # of Visits):  12           Authorizing Physician: Dr. Jameson Medeiros  Next MD visit: none scheduled  Fall Risk: standard         Precautions: n/a           Pain ratin/1 lifts    3x10     Standing hip 3-way 3x10 BOSU       Lateral step ups with dips        Pallof press 3x10 green   3x10 green with lateral step ups to airex    [place] 2x10 10lbs       LTR LE's (L)  3x1min              Manual Therapy 8 mins 8 mins 23 mins

## 2021-12-08 ENCOUNTER — OFFICE VISIT (OUTPATIENT)
Dept: PHYSICAL THERAPY | Age: 46
End: 2021-12-08
Attending: PHYSICAL THERAPIST
Payer: COMMERCIAL

## 2021-12-08 PROCEDURE — 97014 ELECTRIC STIMULATION THERAPY: CPT | Performed by: PHYSICAL THERAPIST

## 2021-12-08 PROCEDURE — 97140 MANUAL THERAPY 1/> REGIONS: CPT | Performed by: PHYSICAL THERAPIST

## 2021-12-08 PROCEDURE — 97110 THERAPEUTIC EXERCISES: CPT | Performed by: PHYSICAL THERAPIST

## 2021-12-08 NOTE — PROGRESS NOTES
Dx: Facet arthropathy, lumbar M47.816  Lumbar radiculopathy M54.16    Insurance (Authorized # of Visits):  12           Authorizing Physician: Dr. Marcie Mccabe  Next MD visit: none scheduled  Fall Risk: standard         Precautions: n/a           Pain ratin/1 Hooklying TA isometrics         Prone alt/opp UE/LE lifts    3x10      Standing hip 3-way 3x10 BOSU    3x10 BOSU with green tband    Lateral step ups with dips         Pallof press 3x10 green   3x10 green with lateral step ups to airex 3x10 green with la

## 2021-12-13 ENCOUNTER — OFFICE VISIT (OUTPATIENT)
Dept: PHYSICAL THERAPY | Age: 46
End: 2021-12-13
Attending: PHYSICAL THERAPIST
Payer: COMMERCIAL

## 2021-12-13 PROCEDURE — 97014 ELECTRIC STIMULATION THERAPY: CPT | Performed by: PHYSICAL THERAPIST

## 2021-12-13 PROCEDURE — 97110 THERAPEUTIC EXERCISES: CPT | Performed by: PHYSICAL THERAPIST

## 2021-12-13 NOTE — PROGRESS NOTES
Dx: Facet arthropathy, lumbar M47.816  Lumbar radiculopathy M54.16    Insurance (Authorized # of Visits):  12           Authorizing Physician: Dr. Bobby Shelby  Next MD visit: none scheduled  Fall Risk: standard         Precautions: n/a           Pain rating: 3.5 step ups to airex 3x10 green with lateral step ups to airex 3x10 dbl yellow with lateral step ups to airex    [place]   3x10 15 lbs airex    LTR LE's (L)       Lateral walking   2x20ft green tband with ring squeeze 3 x 20ft green tband    FWD/retro monst

## 2021-12-16 ENCOUNTER — OFFICE VISIT (OUTPATIENT)
Dept: PHYSICAL THERAPY | Age: 46
End: 2021-12-16
Attending: PHYSICAL THERAPIST
Payer: COMMERCIAL

## 2021-12-16 PROCEDURE — 97110 THERAPEUTIC EXERCISES: CPT | Performed by: PHYSICAL THERAPIST

## 2021-12-16 PROCEDURE — 97014 ELECTRIC STIMULATION THERAPY: CPT | Performed by: PHYSICAL THERAPIST

## 2021-12-16 NOTE — PROGRESS NOTES
Neal  Pt has attended 16 visits in Physical Therapy.    Dx: Facet arthropathy, lumbar M47.816  Lumbar radiculopathy M54.16    Insurance (Authorized # of Visits):  12           Authorizing Physician: Dr. Jessica Servin  Next MD visit: none scheduled  Fa Date: 12/6/2021  Tx#: 13/16 Date: 12/8/2021  Tx#: 14/16 Date: 12/13/2021  Tx#: 15/16 Date: 12/16/2021  Tx#:16/16    Thera Ex 38 mins 38 mins 50 mins 55 mins   Slant board stretch L5 3x1 min L5 3x1 min L5 3x1min L5 3x1 min (B)   TM retrowalk 12% grade 1mph Jeb Flores, [address] [place] [address] certification required:  No  Please co-sign or sign and return this letter via fax as soon as possible to [phone]. I certify the need for these services furnished under this plan of treatment and while under my care.     X_

## 2021-12-20 ENCOUNTER — TELEPHONE (OUTPATIENT)
Dept: PHYSICAL MEDICINE AND REHAB | Facility: CLINIC | Age: 46
End: 2021-12-20

## 2021-12-20 ENCOUNTER — OFFICE VISIT (OUTPATIENT)
Dept: PHYSICAL MEDICINE AND REHAB | Facility: CLINIC | Age: 46
End: 2021-12-20

## 2021-12-20 VITALS
SYSTOLIC BLOOD PRESSURE: 128 MMHG | OXYGEN SATURATION: 97 % | BODY MASS INDEX: 25.05 KG/M2 | HEIGHT: 70 IN | DIASTOLIC BLOOD PRESSURE: 92 MMHG | HEART RATE: 101 BPM | WEIGHT: 175 LBS

## 2021-12-20 DIAGNOSIS — M79.10 MYALGIA: ICD-10-CM

## 2021-12-20 DIAGNOSIS — M54.9 TRIGGER POINT WITH BACK PAIN: Primary | ICD-10-CM

## 2021-12-20 PROCEDURE — 3074F SYST BP LT 130 MM HG: CPT | Performed by: PHYSICAL MEDICINE & REHABILITATION

## 2021-12-20 PROCEDURE — 3080F DIAST BP >= 90 MM HG: CPT | Performed by: PHYSICAL MEDICINE & REHABILITATION

## 2021-12-20 PROCEDURE — 99214 OFFICE O/P EST MOD 30 MIN: CPT | Performed by: PHYSICAL MEDICINE & REHABILITATION

## 2021-12-20 PROCEDURE — 20553 NJX 1/MLT TRIGGER POINTS 3/>: CPT | Performed by: PHYSICAL MEDICINE & REHABILITATION

## 2021-12-20 PROCEDURE — 3008F BODY MASS INDEX DOCD: CPT | Performed by: PHYSICAL MEDICINE & REHABILITATION

## 2021-12-20 NOTE — TELEPHONE ENCOUNTER
Per IHP guidelines-no authorization required for Lidocaine/Kenalog injections in office as long as PCP is [facility]    Left gluteus medius and evin trigger point injection  CPT 46437+    Status: Approved-Covered Benefit  Confirmed above with [facility]- PSYCHIATRY & ADDICTIVE MED

## 2021-12-22 ENCOUNTER — MED REC SCAN ONLY (OUTPATIENT)
Dept: NEUROLOGY | Facility: CLINIC | Age: 46
End: 2021-12-22

## 2021-12-27 PROBLEM — M54.9 TRIGGER POINT WITH BACK PAIN: Status: ACTIVE | Noted: 2021-12-27

## 2021-12-27 NOTE — PROGRESS NOTES
[address]  FOLLOW UP EVALUATION    Chief Complaint: back pain.     HISTORY OF PRESENT ILLNESS:   Patient presents with:  Low Back Pain: LOV: 8/16/21 Patient c/o of L low back pain that radiates to lateral/me of bowel bladder control, any fevers chills or weight loss. He has had MRI imaging of the lumbar spine as noted below. He works physical jobs including [employer] and working for a [employer] PAST MEDICAL HISTORY:   History reviewed.  No pertinen guarding  Extremities: No lower extremity edema bilaterally   Skin: No lesions noted   Cognition: alert & oriented x 3, attentive, able to follow 2 step commands, comprehention intact, spontaneous speech intact  Psychiatric: Mood and affect appropriate worse than right facet arthropathy at L5-S1 with increased signal seen in the left L5-S1 facet joint. He has a linear signal at the L5 pedicle as well suggestive of stress changes.   There is no significant disc bulge, spinal or foraminal stenosis at this

## 2021-12-27 NOTE — PROCEDURES
Procedure: Trigger point injections    Indication: left low back pain    Consent: Informed consent was obtained from patient.  Patient was explained the risks, benefits and alternatives of procedure, risks including but not limited to bleeding, infection,

## 2022-01-14 ENCOUNTER — TELEMEDICINE (OUTPATIENT)
Dept: PHYSICAL MEDICINE AND REHAB | Facility: CLINIC | Age: 47
End: 2022-01-14

## 2022-01-14 ENCOUNTER — TELEPHONE (OUTPATIENT)
Dept: NEUROLOGY | Facility: CLINIC | Age: 47
End: 2022-01-14

## 2022-01-14 DIAGNOSIS — M54.16 LEFT LUMBAR RADICULOPATHY: Primary | ICD-10-CM

## 2022-01-14 DIAGNOSIS — G89.29 CHRONIC LEFT HIP PAIN: ICD-10-CM

## 2022-01-14 DIAGNOSIS — M25.552 CHRONIC LEFT HIP PAIN: ICD-10-CM

## 2022-01-14 PROCEDURE — 99214 OFFICE O/P EST MOD 30 MIN: CPT | Performed by: PHYSICAL MEDICINE & REHABILITATION

## 2022-01-14 NOTE — TELEPHONE ENCOUNTER
MRI HIPS, LEFT (KHQ=[id number])-ZM pre certification is required. Imaging requests for IHP coverage and schedule at any of the [facility] locations are auto-approved. Request does not require a prior-authorization.     Notified patient via [employer]

## 2022-01-14 NOTE — PROGRESS NOTES
[address]    Telemedicine Visit - Follow Up Evaluation    Telehealth Verbal Consent   I conducted a telehealth visit with Padilla Smith today, 1/14/22, which was completed using two-way, real-time intera with pain in the left shin and calf. He notices a sharp shooting pain occasionally with prolonged sitting standing or increased activity and certain movements such as twisting of the lumbar spine.   He continues to experience moderate dysfunction with his found for: GLU, BUN, BUNCREA, CREATSERUM, ANIONGAP, GFR, GFRNAA, GFRAA, CA, OSMOCALC, ALKPHO, AST, ALT, ALKPHOS, BILT, TP, ALB, GLOBULIN, AGRATIO, NA, K, CL, CO2  No results found for: PTP, PT, INR  No results found for: VITD, QVITD, [id number]    IMAGING: worsen COVID-19 infection symptoms. The patient was also informed that corticosteroids, in any form, may significantly decrease immune response and may increase risk and complications of infection.     The patient was advised that given the current situati

## 2022-01-17 ENCOUNTER — TELEPHONE (OUTPATIENT)
Dept: PHYSICAL MEDICINE AND REHAB | Facility: CLINIC | Age: 47
End: 2022-01-17

## 2022-01-17 NOTE — TELEPHONE ENCOUNTER
СВЕТЛАНА      PSR:    Please schedule patient at that [facility] once approved and 2-week follow-up after injection as well as for MRI follow-up after imaging of the hip

## 2022-01-26 ENCOUNTER — HOSPITAL ENCOUNTER (OUTPATIENT)
Dept: MRI IMAGING | Facility: HOSPITAL | Age: 47
Discharge: HOME | End: 2022-01-26
Attending: PHYSICAL MEDICINE & REHABILITATION
Payer: COMMERCIAL

## 2022-01-26 ENCOUNTER — TELEPHONE (OUTPATIENT)
Dept: NEUROLOGY | Facility: CLINIC | Age: 47
End: 2022-01-26

## 2022-01-26 DIAGNOSIS — M25.552 CHRONIC LEFT HIP PAIN: ICD-10-CM

## 2022-01-26 DIAGNOSIS — G89.29 CHRONIC LEFT HIP PAIN: ICD-10-CM

## 2022-01-26 PROCEDURE — 73721 MRI JNT OF LWR EXTRE W/O DYE: CPT | Performed by: PHYSICAL MEDICINE & REHABILITATION

## 2022-01-26 NOTE — TELEPHONE ENCOUNTER
----- Message from Anel Marshall DO sent at 1/26/2022  2:58 PM CST -----  MRI shows tear in the labrum as well as gluteal tendons.   Please and follow-up as discussed

## 2022-02-01 ENCOUNTER — TELEPHONE (OUTPATIENT)
Dept: PHYSICAL MEDICINE AND REHAB | Facility: CLINIC | Age: 47
End: 2022-02-01

## 2022-02-01 ENCOUNTER — OFFICE VISIT (OUTPATIENT)
Dept: PHYSICAL MEDICINE AND REHAB | Facility: CLINIC | Age: 47
End: 2022-02-01
Payer: COMMERCIAL

## 2022-02-01 VITALS
WEIGHT: 175 LBS | HEART RATE: 82 BPM | DIASTOLIC BLOOD PRESSURE: 70 MMHG | HEIGHT: 70 IN | SYSTOLIC BLOOD PRESSURE: 118 MMHG | OXYGEN SATURATION: 98 % | BODY MASS INDEX: 25.05 KG/M2

## 2022-02-01 DIAGNOSIS — M25.552 CHRONIC LEFT HIP PAIN: ICD-10-CM

## 2022-02-01 DIAGNOSIS — M70.62 GREATER TROCHANTERIC BURSITIS OF LEFT HIP: Primary | ICD-10-CM

## 2022-02-01 DIAGNOSIS — G89.29 CHRONIC LEFT HIP PAIN: ICD-10-CM

## 2022-02-01 PROCEDURE — 3074F SYST BP LT 130 MM HG: CPT | Performed by: PHYSICAL MEDICINE & REHABILITATION

## 2022-02-01 PROCEDURE — 3008F BODY MASS INDEX DOCD: CPT | Performed by: PHYSICAL MEDICINE & REHABILITATION

## 2022-02-01 PROCEDURE — 3078F DIAST BP <80 MM HG: CPT | Performed by: PHYSICAL MEDICINE & REHABILITATION

## 2022-02-01 PROCEDURE — 99214 OFFICE O/P EST MOD 30 MIN: CPT | Performed by: PHYSICAL MEDICINE & REHABILITATION

## 2022-02-01 NOTE — TELEPHONE ENCOUNTER
Per IHP guidelines-no authorization required for Lidocaine/Kenalog injections in office if PCP is [facility] provider    Ultrasound guided left greater trochanteric bursa and gluteus medius trigger point injection with corticosteroid CPT [id number]    Status: Approved-Covered Benefit    Routing to clinical staff to schedule

## 2022-02-02 ENCOUNTER — OFFICE VISIT (OUTPATIENT)
Dept: PHYSICAL MEDICINE AND REHAB | Facility: CLINIC | Age: 47
End: 2022-02-02
Payer: COMMERCIAL

## 2022-02-02 DIAGNOSIS — M70.62 GREATER TROCHANTERIC BURSITIS OF LEFT HIP: Primary | ICD-10-CM

## 2022-02-02 PROCEDURE — 20611 DRAIN/INJ JOINT/BURSA W/US: CPT | Performed by: PHYSICAL MEDICINE & REHABILITATION

## 2022-02-02 NOTE — PATIENT INSTRUCTIONS
Steroid Injection Information  What to expect: The injection contains Lidocaine (which numbs the area) and Kenalog (a steroid which decreases inflammation). You may have pain relief within hours of the injection due to the Lidocaine. The Kenalog can take a couple days, up to a couple weeks, to reach the full effect. It is also possible to have a slight increase in symptoms over the first few days, but that should resolve fairly quickly. How long will the injection last?: The length of response to an injection is variable. Literally a couple weeks to a couple years. The injection will decrease the inflammation and the pain will return if/when the inflammation returns. Activity Recommendations: For the first 24 hours after injection, keep the area clean and dry. It is ok to shower but don't soak in a tub during that time. No vigorous activity such as running or heavy lifting for the first week but other than that you can gradually resume your normal activities immediately. If you have a significant decrease in pain, be careful not to do too much too soon. Again, the key is GRADUAL resumption of activites. Things to look out for: Common injection side effects include soreness at the injection site, bruising, flushing of the face or skin, and a temporary increase in your blood sugars and/or blood pressure. Infection is very rare but please notify my office [facility] for [address] [phone]) if you develop any fevers, drainage from the injection site, or severe increase in pain. If it is the weekend, go to an Emergency Room.         Follow up in four weeks-virtual

## 2022-02-03 NOTE — PROCEDURES
Procedure:  Ultrasound guided LEFT greater trochanteric bursa injection   The risks, benefits and anticipated outcomes of the procedure, the risks and benefits of the alternatives to the procedure, and the roles and tasks of the personnel to be involved, were discussed with the patient, and the patient consents to the procedure and agrees to proceed. UNIVERSAL PROTOCOL / SAFETY CHECKLIST  Sign in Communication: Completed  Time Out:  Team Confirms the Correct Patient, Correct Procedure, Correct Site and Site Marking, Correct Position (if applicable), Prep and Dry Time (if applicable). Affirmation of Time Out: YES    Sign Out Discussion: Completed if applicable  The procedure was carried out under sterile prep  with sterile gel. A 27 ga 1&1/4in needle was introduced and advanced with ultrasound guidance for skin anesthesia with 3 cc of 1% lidocaine. Then, again with real time ultrasound guidance, a 22 gauge 3.5 in needle was advanced from lateral to medial and posterior to anterior with the transducer in transverse orientation into the greater trochanteric bursa. Following negative aspiration, a mixture of 4 cc of 1% lidocaine and 1 cc of Kenalog (40mg/ml) was injected. Permanent images were taken and stored in the PACS system. Ultrasound interpretation was performed prior to the procedure to identify the target and any adjacent neurovascular structures. Subsequently, interpretation was performed during real-time needle guidance confirming placement. Post-intervention interpretation was also performed confirming appropriate injectate flow and hemostasis. The patient tolerated the procedure without complication and was instructed in post-procedure precautions. See patient instructions.      Renan Powell DO, FAAPMR & CAQSM  Physical Medicine and Rehabilitation/Sports Medicine  MEDICAL [facility]

## 2022-02-04 ENCOUNTER — TELEPHONE (OUTPATIENT)
Dept: PHYSICAL MEDICINE AND REHAB | Facility: CLINIC | Age: 47
End: 2022-02-04

## 2022-02-07 ENCOUNTER — TELEPHONE (OUTPATIENT)
Dept: NEUROLOGY | Facility: CLINIC | Age: 47
End: 2022-02-07

## 2022-02-07 NOTE — TELEPHONE ENCOUNTER
Received in basket from [email or web address]   advising of approval for L5-S1 Interlaminar Epidural Steroid Injection under fluoroscopy cpt codes 01979, 83090. . Will  inform Nursing.

## 2022-03-07 ENCOUNTER — OFFICE VISIT (OUTPATIENT)
Dept: PHYSICAL MEDICINE AND REHAB | Facility: CLINIC | Age: 47
End: 2022-03-07
Payer: COMMERCIAL

## 2022-03-07 VITALS
SYSTOLIC BLOOD PRESSURE: 132 MMHG | DIASTOLIC BLOOD PRESSURE: 90 MMHG | HEART RATE: 82 BPM | OXYGEN SATURATION: 98 % | BODY MASS INDEX: 25.77 KG/M2 | WEIGHT: 180 LBS | HEIGHT: 70 IN

## 2022-03-07 DIAGNOSIS — M25.552 CHRONIC LEFT HIP PAIN: ICD-10-CM

## 2022-03-07 DIAGNOSIS — G89.29 CHRONIC LEFT HIP PAIN: ICD-10-CM

## 2022-03-07 DIAGNOSIS — M54.9 TRIGGER POINT WITH BACK PAIN: ICD-10-CM

## 2022-03-07 DIAGNOSIS — M54.16 LEFT LUMBAR RADICULOPATHY: Primary | ICD-10-CM

## 2022-03-07 PROCEDURE — 99214 OFFICE O/P EST MOD 30 MIN: CPT | Performed by: PHYSICAL MEDICINE & REHABILITATION

## 2022-03-07 PROCEDURE — 3080F DIAST BP >= 90 MM HG: CPT | Performed by: PHYSICAL MEDICINE & REHABILITATION

## 2022-03-07 PROCEDURE — 3008F BODY MASS INDEX DOCD: CPT | Performed by: PHYSICAL MEDICINE & REHABILITATION

## 2022-03-07 PROCEDURE — 3075F SYST BP GE 130 - 139MM HG: CPT | Performed by: PHYSICAL MEDICINE & REHABILITATION

## 2022-03-07 RX ORDER — NORTRIPTYLINE HYDROCHLORIDE 10 MG/1
10 CAPSULE ORAL NIGHTLY
Status: DISCONTINUED | OUTPATIENT
Start: 2022-03-07 | End: 2022-03-17

## 2022-03-07 NOTE — PATIENT INSTRUCTIONS
-Continue PT and home exercises  -Keep using the bike  -Nortriptyline at nighttime  -Follow up in 4 weeks

## 2022-03-08 ENCOUNTER — TELEPHONE (OUTPATIENT)
Dept: PHYSICAL MEDICINE AND REHAB | Facility: CLINIC | Age: 47
End: 2022-03-08

## 2022-03-17 ENCOUNTER — TELEPHONE (OUTPATIENT)
Dept: PHYSICAL MEDICINE AND REHAB | Facility: CLINIC | Age: 47
End: 2022-03-17

## 2022-03-17 RX ORDER — NORTRIPTYLINE HYDROCHLORIDE 10 MG/1
10 CAPSULE ORAL NIGHTLY
Qty: 30 CAPSULE | Refills: 0 | Status: SHIPPED | OUTPATIENT
Start: 2022-03-17 | End: 2022-04-15

## 2022-03-17 NOTE — TELEPHONE ENCOUNTER
LOV 3/7/22.  Rx for nortriptyline written by Dr Abbey Mcgrath but not send electronically to [facility]. Gave verbal order now for nortriptyline 10 mg at HS #30 R0 to [facility].

## 2022-04-15 RX ORDER — NORTRIPTYLINE HYDROCHLORIDE 10 MG/1
CAPSULE ORAL
Qty: 30 CAPSULE | Refills: 0 | Status: SHIPPED | OUTPATIENT
Start: 2022-04-15

## 2022-04-15 NOTE — TELEPHONE ENCOUNTER
Left a detailed message to call office back to schedule a follow up appointment. Per last office visit on 3/7/2022 to follow up in 4 weeks. No appointment scheduled. Medication request: Nortriptyline 10 mg oral cap. Take 1 capsule by mouth nightly. #30. No refills.      MYC:8/8/0731  NOV: none    [place]:3/17/2022

## 2023-03-09 ENCOUNTER — ORDER TRANSCRIPTION (OUTPATIENT)
Dept: PHYSICAL THERAPY | Facility: HOSPITAL | Age: 48
End: 2023-03-09

## 2023-03-09 DIAGNOSIS — S73.192A TEAR OF LEFT ACETABULAR LABRUM: Primary | ICD-10-CM

## 2025-04-19 NOTE — ED PROVIDER NOTES
Patient Seen in: [facility]      History     Chief Complaint   Patient presents with    Pain     Stated Complaint: Strain, Minor - neck pain.    Subjective:   HPI    Patient is a 49-year-old male, presenting to immediate care for evaluation of acute atraumatic left-sided posterior neck and upper back pain for greater than 1 month.  Intermittent.  States saw primary doctor last month for initial annual checkup.  Has not seen doctor in a long time.  States at that time had multiple concerns addressed.  States he did have localized pain on left side of neck.  He was told he had a knot on left side of neck.  He was treated supportively.  Has been taking ibuprofen and Tylenol for pain with minimal improvement.  Last took ibuprofen 200 mg this morning.  Pain has been progressively worsening.  Worse in the last several days.  States he is physically active including doing household chores and playing with children at home.  In addition he does a lot of lifting and moving at work.  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  He denies any specific trauma.  Denies any extremity numbness weakness paresthesias.  No associated bruising.  No rash.      History of Present Illness               Objective:     No pertinent past medical history.            No pertinent past surgical history.              No pertinent social history.            Review of Systems   Constitutional:  Positive for activity change. Negative for fever.   HENT:  Negative for congestion and facial swelling.    Respiratory:  Negative for cough and shortness of breath.    Cardiovascular:  Negative for chest pain.   Gastrointestinal:  Negative for abdominal pain, nausea and vomiting.   Musculoskeletal:  Positive for neck pain.   Skin:  Negative for rash and wound.   Allergic/Immunologic: Negative for immunocompromised state.   Neurological:  Negative for dizziness, weakness, light-headedness, numbness and headaches.   Psychiatric/Behavioral:  Negative for  confusion.    All other systems reviewed and are negative.      Positive for stated complaint: Strain, Minor - neck pain.  Other systems are as noted in HPI.  Constitutional and vital signs reviewed.      All other systems reviewed and negative except as noted above.                  Physical Exam     ED Triage Vitals [04/19/25 1222]   /68   Pulse 75   Resp 16   Temp 97.6 °F (36.4 °C)   Temp src Oral   SpO2 96 %   O2 Device None (Room air)       Current Vitals:   Vital Signs  BP: 101/68  Pulse: 75  Resp: 16  Temp: 97.6 °F (36.4 °C)  Temp src: Oral    Oxygen Therapy  SpO2: 96 %  O2 Device: None (Room air)        Physical Exam  Vitals and nursing note reviewed.   Constitutional:       General: He is not in acute distress.     Appearance: Normal appearance. He is not ill-appearing, toxic-appearing or diaphoretic.   HENT:      Head: Normocephalic and atraumatic.      Mouth/Throat:      Mouth: Mucous membranes are moist.   Eyes:      General: No scleral icterus.     Conjunctiva/sclera: Conjunctivae normal.   Neck:      Comments: Tenderness to palpation cervical portion of left trapezius muscle, muscle tightness, no spasm.  No midline cervical spinal tenderness.  No step-offs.  No torticollis.  No rash.  No ecchymosis.  No hematoma  Cardiovascular:      Rate and Rhythm: Normal rate and regular rhythm.      Pulses: Normal pulses.   Pulmonary:      Effort: No respiratory distress.   Musculoskeletal:         General: Normal range of motion.      Cervical back: Neck supple. Tenderness present.   Neurological:      General: No focal deficit present.      Mental Status: He is alert and oriented to person, place, and time.      Motor: No weakness.      Coordination: Coordination normal.      Gait: Gait normal.   Psychiatric:         Mood and Affect: Mood normal.         Behavior: Behavior normal.         Physical Exam                ED Course   Labs Reviewed - No data to display  Results         XR CERVICAL SPINE (2-3  VIEWS) (CPT=72040)   Final Result   PROCEDURE: XR CERVICAL SPINE (2 OR 3 VIEWS) (CPT=72040)       COMPARISON: None available.       INDICATIONS: Atraumatic posterior neck pain over the preceding 1 month       TECHNIQUE: Cervical spine radiographs (3 views)       FINDINGS:    ALIGNMENT: The cervical lordosis is minimally interrupted by trace    anterolisthesis of C4 on C5.   ATLANTOAXIAL: The odontoid and atlantoaxial joints are unremarkable.     VERTEBRAL BODIES: There is no evidence of fracture or radiographically    apparent osseous lesion. The vertebral body heights are preserved.   DISC SPACES: Slight intervertebral disc space narrowing is noted at C5-C6.   PREVERTEBRAL SOFT TISSUES: Negative for swelling or radiopaque foreign    body.     OTHER:   Dental amalgam is present.  Visualized portions of the lung    apices are grossly clear.                       =====   CONCLUSION:    1. Minimal degenerative changes of the cervical spine, particularly at    C4-C5 and C5-C6.       2. No radiographically visible acute osseous injury of the cervical spine.               Dictated by (CST): Dwayne Ahumada MD on 4/19/2025 at 12:56 PM        Finalized by (CST): Dwayne Ahumada MD on 4/19/2025 at 12:57 PM                         MDM     Patient is a 49-year-old male, presenting to immediate care for evaluation of acute atraumatic left posterior neck and upper back pain for at least 1 month.  Intermittent.  Denies any trauma or injury or fall.  Does do a lot of lifting and is active and with movement.  Did initially see primary doctor for annual checkup which was noted to have a muscle knot on left side of neck.  Has been treated supportively.  He denies any associated fever or systemic symptoms.  No radicular symptoms.  Patient tender to palpation cervical portion of left trapezius muscle with muscle tightness.  No spasm.  No midline cervical spinal tenderness.  Initial evaluation with x-ray imaging cervical spine negative  for cervical spine fracture or subluxation.  There is degenerative cervical disc disease minimal.  Patient given IM Toradol in clinic for pain.  Will treat outpatient supportively.  Pain management with ibuprofen 600 mg, lidocaine patch, and tramadol as needed for pain (#8 tablets prescribed), Flexeril as needed muscle relaxer, and Medrol Dosepak for anti-inflammatory.  Discussed limitations of muscle relaxer and narcotic as increased sedation and fall risk and respiratory depression.  Further PCP follow-up and management with physiatry.      Medical Decision Making      Disposition and Plan     Clinical Impression:  1. Strain of cervical portion of left trapezius muscle    2. Cervical disc disease         Disposition:  Discharge  4/19/2025  1:07 pm    Follow-up:  Navi Rudd Y, DO  [address] [facility] [address]  [facility] [address]  [phone]    Schedule an appointment as soon as possible for a visit   Pysiatry, For management and rehab of posterior neck and back pain          Medications Prescribed:  Discharge Medication List as of 4/19/2025  1:07 PM        START taking these medications    Details   !! methylPREDNISolone (MEDROL) 4 MG Oral Tablet Therapy Pack Dosepack: take as directed, Normal, Disp-1 each, R-0      traMADol 50 MG Oral Tab Take 1 tablet (50 mg total) by mouth every 8 (eight) hours as needed for Pain., Normal, Disp-8 tablet, R-0      cyclobenzaprine 10 MG Oral Tab Take 1 tablet (10 mg total) by mouth 2 (two) times daily as needed for Muscle spasms (muscle relaxer)., Normal, Disp-20 tablet, R-0      lidocaine 5 % External Patch Place 1 patch onto the skin daily., Normal, Disp-30 patch, R-0If lidocaine patches 5% not covered by insurance.  Please instruct patient for present lidocaine patches and/aspercreme available over-the-counter as alternative.       !! - Potential duplicate medications found. Please discuss with provider.          Supplementary Documentation:

## (undated) NOTE — LETTER
AUTHORIZATION FOR SURGICAL OPERATION OR OTHER PROCEDURE    1. I hereby authorize Dr. Sherrie Yang and the [facility] staff assigned to my case to perform the following operation and/or procedure at the [facility]:    TPI    2.   My physician has explained the natu person                          []  Spouse  In case of minor or                    [] Other  _____________   Incompetent name:  __________________________________________________                               (please print)      _____________      Micheal Galicia

## (undated) NOTE — LETTER
AUTHORIZATION FOR SURGICAL OPERATION OR OTHER PROCEDURE    1. I hereby authorize Dr. Vonda Willis and the [facility] staff assigned to my case to perform the following operation and/or procedure at the [facility]:  Ultrasound guided left greater trochanteric bursa and gluteus medius trigger point injection with corticosteroid    2. My physician has explained the nature and purpose of the operation or other procedure, possible alternative methods of treatment, the risks involved, and the possibility of complication to me. I acknowledge that no guarantee has been made as to the result that may be obtained. 3.  I recognize that, during the course of this operation, or other procedure, unforseen conditions may necessitate additional or different procedure than those listed above. I, therefore, further authorize and request that the above named physician, his/her physician assistants or designees perform such procedures as are, in his/her professional opinion, necessary and desirable. 4.  Any tissue or organs removed in the operation or other procedure may be disposed of by and at the discretion of the [facility] staff and [facility]. 5.  I understand that in the event of a medical emergency, I will be transported by local paramedics to [facility] or other hospital emergency department. 6.  I certify that I have read and fully understand the above consent to operation and/or other procedure. 7.  I acknowledge that my physician has explained sedation/analgesia administration to me including the risks and benefits. I consent to the administration of sedation/analgesia as may be necessary or desirable in the judgement of my physician. Witness signature: ___________________________________________________ Date:  ______/______/_____                    Time:  ________ A. M.  P.M.        Patient Name:  Dante Fiore  5/22/1975  [id number]             Patient signature: ___________________________________________________            Statement of Physician  My signature below affirms that prior to the time of the procedure, I have explained to the patient and/or his/her guardian, the risks and benefits involved in the proposed treatment and any reasonable alternative to the proposed treatment. I have also explained the risks and benefits involved in the refusal of the proposed treatment and have answered the patient's questions.                         Date:  ______/______/_______  Provider                      Signature:  __________________________________________________________       Time:  ___________ AGIN DECKER

## (undated) NOTE — Clinical Note
Please schedule patient at that [facility] once approved and 2-week follow-up after injection as well as for MRI follow-up after imaging of the hip